# Patient Record
Sex: MALE | Race: WHITE
[De-identification: names, ages, dates, MRNs, and addresses within clinical notes are randomized per-mention and may not be internally consistent; named-entity substitution may affect disease eponyms.]

---

## 2019-10-17 ENCOUNTER — APPOINTMENT (OUTPATIENT)
Dept: GASTROENTEROLOGY | Facility: CLINIC | Age: 79
End: 2019-10-17
Payer: COMMERCIAL

## 2019-10-17 ENCOUNTER — LABORATORY RESULT (OUTPATIENT)
Age: 79
End: 2019-10-17

## 2019-10-17 VITALS
WEIGHT: 170 LBS | HEIGHT: 67 IN | DIASTOLIC BLOOD PRESSURE: 83 MMHG | SYSTOLIC BLOOD PRESSURE: 151 MMHG | BODY MASS INDEX: 26.68 KG/M2 | HEART RATE: 46 BPM

## 2019-10-17 DIAGNOSIS — Z80.0 FAMILY HISTORY OF MALIGNANT NEOPLASM OF DIGESTIVE ORGANS: ICD-10-CM

## 2019-10-17 DIAGNOSIS — K58.9 IRRITABLE BOWEL SYNDROME W/OUT DIARRHEA: ICD-10-CM

## 2019-10-17 DIAGNOSIS — R14.0 ABDOMINAL DISTENSION (GASEOUS): ICD-10-CM

## 2019-10-17 DIAGNOSIS — Z95.5 PRESENCE OF CORONARY ANGIOPLASTY IMPLANT AND GRAFT: ICD-10-CM

## 2019-10-17 DIAGNOSIS — Z86.010 PERSONAL HISTORY OF COLONIC POLYPS: ICD-10-CM

## 2019-10-17 DIAGNOSIS — E66.3 OVERWEIGHT: ICD-10-CM

## 2019-10-17 DIAGNOSIS — M19.071 PRIMARY OSTEOARTHRITIS, RIGHT ANKLE AND FOOT: ICD-10-CM

## 2019-10-17 DIAGNOSIS — I25.2 OLD MYOCARDIAL INFARCTION: ICD-10-CM

## 2019-10-17 DIAGNOSIS — Z72.89 OTHER PROBLEMS RELATED TO LIFESTYLE: ICD-10-CM

## 2019-10-17 DIAGNOSIS — Z86.79 PERSONAL HISTORY OF OTHER DISEASES OF THE CIRCULATORY SYSTEM: ICD-10-CM

## 2019-10-17 DIAGNOSIS — N40.0 BENIGN PROSTATIC HYPERPLASIA WITHOUT LOWER URINARY TRACT SYMPMS: ICD-10-CM

## 2019-10-17 DIAGNOSIS — E78.5 HYPERLIPIDEMIA, UNSPECIFIED: ICD-10-CM

## 2019-10-17 DIAGNOSIS — Z87.01 PERSONAL HISTORY OF PNEUMONIA (RECURRENT): ICD-10-CM

## 2019-10-17 DIAGNOSIS — M19.072 PRIMARY OSTEOARTHRITIS, RIGHT ANKLE AND FOOT: ICD-10-CM

## 2019-10-17 PROCEDURE — 99204 OFFICE O/P NEW MOD 45 MIN: CPT | Mod: 25

## 2019-10-17 PROCEDURE — 82274 ASSAY TEST FOR BLOOD FECAL: CPT | Mod: QW

## 2019-10-17 PROCEDURE — 36415 COLL VENOUS BLD VENIPUNCTURE: CPT

## 2019-10-17 RX ORDER — METOPROLOL TARTRATE 50 MG/1
50 TABLET, FILM COATED ORAL
Refills: 0 | Status: ACTIVE | COMMUNITY

## 2019-10-17 RX ORDER — ROSUVASTATIN CALCIUM 5 MG/1
5 TABLET, FILM COATED ORAL
Refills: 0 | Status: ACTIVE | COMMUNITY

## 2019-10-17 RX ORDER — ASPIRIN 81 MG
81 TABLET,CHEWABLE ORAL
Refills: 0 | Status: ACTIVE | COMMUNITY

## 2019-10-17 RX ORDER — OLMESARTAN MEDOXOMIL 20 MG/1
20 TABLET, FILM COATED ORAL
Refills: 0 | Status: ACTIVE | COMMUNITY

## 2019-10-17 NOTE — PHYSICAL EXAM
[General Appearance - Well Nourished] : well nourished [General Appearance - Alert] : alert [General Appearance - In No Acute Distress] : in no acute distress [Sclera] : the sclera and conjunctiva were normal [General Appearance - Well Developed] : well developed [Oropharynx] : the oropharynx was normal [Outer Ear] : the ears and nose were normal in appearance [Neck Appearance] : the appearance of the neck was normal [Neck Cervical Mass (___cm)] : no neck mass was observed [Jugular Venous Distention Increased] : there was no jugular-venous distention [Thyroid Diffuse Enlargement] : the thyroid was not enlarged [Auscultation Breath Sounds / Voice Sounds] : lungs were clear to auscultation bilaterally [Thyroid Nodule] : there were no palpable thyroid nodules [Heart Sounds] : normal S1 and S2 [Heart Sounds Gallop] : no gallops [Heart Rate And Rhythm] : heart rate was normal and rhythm regular [Murmurs] : no murmurs [Heart Sounds Pericardial Friction Rub] : no pericardial rub [Bowel Sounds] : normal bowel sounds [Full Pulse] : the pedal pulses are present [Edema] : there was no peripheral edema [Abdomen Soft] : soft [] : no hepato-splenomegaly [Abdomen Tenderness] : non-tender [Abdomen Mass (___ Cm)] : no abdominal mass palpated [Internal Hemorrhoid] : internal hemorrhoids [Normal Sphincter Tone] : normal sphincter tone [No Rectal Mass] : no rectal mass [Prostate Size___ (Scale 0-4)] : prostate size was [unfilled] on a scale of 0-4 [Cervical Lymph Nodes Enlarged Posterior Bilaterally] : posterior cervical [Cervical Lymph Nodes Enlarged Anterior Bilaterally] : anterior cervical [Axillary Lymph Nodes Enlarged Bilaterally] : axillary [Supraclavicular Lymph Nodes Enlarged Bilaterally] : supraclavicular [No Spinal Tenderness] : no spinal tenderness [Femoral Lymph Nodes Enlarged Bilaterally] : femoral [Inguinal Lymph Nodes Enlarged Bilaterally] : inguinal [No CVA Tenderness] : no ~M costovertebral angle tenderness [Skin Turgor] : normal skin turgor [Skin Color & Pigmentation] : normal skin color and pigmentation [Oriented To Time, Place, And Person] : oriented to person, place, and time [Affect] : the affect was normal [Impaired Insight] : insight and judgment were intact [External Hemorrhoid] : no external hemorrhoids [Occult Blood Positive] : stool was negative for occult blood [FreeTextEntry1] : FIT negative [Prostate Tenderness] : was not tender

## 2019-10-17 NOTE — CONSULT LETTER
[Dear  ___] : Dear  [unfilled], [Consult Letter:] : I had the pleasure of evaluating your patient, [unfilled]. [Sincerely,] : Sincerely, [Please see my note below.] : Please see my note below. [Consult Closing:] : Thank you very much for allowing me to participate in the care of this patient.  If you have any questions, please do not hesitate to contact me. [FreeTextEntry3] : Patrick Cancino M.D.\par  [DrMaribell  ___] : Dr. DUPREE

## 2019-10-17 NOTE — CONSULT LETTER
[Dear  ___] : Dear  [unfilled], [Consult Letter:] : I had the pleasure of evaluating your patient, [unfilled]. [Please see my note below.] : Please see my note below. [Sincerely,] : Sincerely, [Consult Closing:] : Thank you very much for allowing me to participate in the care of this patient.  If you have any questions, please do not hesitate to contact me. [FreeTextEntry3] : Patrick Cancino M.D.\par  [DrMaribell  ___] : Dr. DUPREE

## 2019-10-17 NOTE — HISTORY OF PRESENT ILLNESS
[FreeTextEntry1] : Over the past year, Momo has noted subtle change in bowels. He typically awakens at 4 AM, takes nearly 30 minutes to fully eliminate (BMs). Stools are loose at times, but are generally well formed. He reports gassiness, tenesmus, and urgency. He thinks that his father had stomach cancer, but possibly it was colon cancer. Last colonoscopy January 2015 revealed hyperplastic polyps and hemorrhoids; an adenoma had been removed at prior colonoscopy.

## 2019-10-17 NOTE — ASSESSMENT
[FreeTextEntry1] : 1. Erratic bowels with gassiness, tenesmus, urgency; history of colonic adenoma, with hyperplastic polyps and hemorrhoids at last colonoscopy January 2015--suspect IBS. Possible IBD, celiac disease.\par 2. Overweight.\par 3. History of MI (plaque dislodged from frequent coughing at time of pneumonia); status post CAD stent December 2012, maintained on baby aspirin. \par 4. History of arrhythmia, well controlled on metoprolol.\par 5. Hyperlipidemia.\par 6. BPH.\par 7. Osteoarthritis, DJD of spine; status post right knee and bilateral hip replacements, right shoulder biceps surgery.\par \par Plan:\par 1. Extensive bloodwork drawn by me this afternoon. He will call for test results after the weekend.\par 2. Try Benefiber 1 heaping tablespoon daily; can increase to twice daily dosing after a couple of weeks if needed.\par 3. Consider repeat colonoscopy (although he would like to abstain from further testing, given risk considerations at his age) and even EGD, pending clinical course. This recommendation may be influenced by lab results.

## 2019-10-18 LAB
ALBUMIN SERPL ELPH-MCNC: 4.5 G/DL
ALP BLD-CCNC: 96 U/L
ALT SERPL-CCNC: 31 U/L
ANION GAP SERPL CALC-SCNC: 13 MMOL/L
AST SERPL-CCNC: 24 U/L
BASOPHILS # BLD AUTO: 0.03 K/UL
BASOPHILS NFR BLD AUTO: 0.5 %
BILIRUB DIRECT SERPL-MCNC: 0.1 MG/DL
BILIRUB SERPL-MCNC: 0.4 MG/DL
BUN SERPL-MCNC: 20 MG/DL
CALCIUM SERPL-MCNC: 10 MG/DL
CHLORIDE SERPL-SCNC: 103 MMOL/L
CHOLEST SERPL-MCNC: 192 MG/DL
CHOLEST/HDLC SERPL: 4.7 RATIO
CO2 SERPL-SCNC: 26 MMOL/L
CREAT SERPL-MCNC: 1.08 MG/DL
CRP SERPL-MCNC: <0.1 MG/DL
EOSINOPHIL # BLD AUTO: 0.14 K/UL
EOSINOPHIL NFR BLD AUTO: 2.3 %
ERYTHROCYTE [SEDIMENTATION RATE] IN BLOOD BY WESTERGREN METHOD: 10 MM/HR
ESTIMATED AVERAGE GLUCOSE: 134 MG/DL
FERRITIN SERPL-MCNC: 185 NG/ML
GGT SERPL-CCNC: 29 U/L
GLUCOSE SERPL-MCNC: 126 MG/DL
HBA1C MFR BLD HPLC: 6.3 %
HCT VFR BLD CALC: 44.6 %
HDLC SERPL-MCNC: 41 MG/DL
HGB BLD-MCNC: 14.6 G/DL
IGA SER QL IEP: 172 MG/DL
IMM GRANULOCYTES NFR BLD AUTO: 0.2 %
IRON SATN MFR SERPL: 22 %
IRON SERPL-MCNC: 79 UG/DL
LDLC SERPL CALC-MCNC: NORMAL MG/DL
LYMPHOCYTES # BLD AUTO: 1.93 K/UL
LYMPHOCYTES NFR BLD AUTO: 32.2 %
MAGNESIUM SERPL-MCNC: 2 MG/DL
MAN DIFF?: NORMAL
MCHC RBC-ENTMCNC: 31.1 PG
MCHC RBC-ENTMCNC: 32.7 GM/DL
MCV RBC AUTO: 95.1 FL
MONOCYTES # BLD AUTO: 0.46 K/UL
MONOCYTES NFR BLD AUTO: 7.7 %
NEUTROPHILS # BLD AUTO: 3.42 K/UL
NEUTROPHILS NFR BLD AUTO: 57.1 %
PHOSPHATE SERPL-MCNC: 3.7 MG/DL
PLATELET # BLD AUTO: 207 K/UL
POTASSIUM SERPL-SCNC: 4.7 MMOL/L
PROT SERPL-MCNC: 6.9 G/DL
PSA SERPL-MCNC: 0.47 NG/ML
RBC # BLD: 4.69 M/UL
RBC # FLD: 13.1 %
SODIUM SERPL-SCNC: 141 MMOL/L
T3 SERPL-MCNC: 111 NG/DL
T3RU NFR SERPL: 1 TBI
T4 FREE SERPL-MCNC: 1 NG/DL
T4 SERPL-MCNC: 5.8 UG/DL
TIBC SERPL-MCNC: 365 UG/DL
TRIGL SERPL-MCNC: 459 MG/DL
TSH SERPL-ACNC: 1.51 UIU/ML
UIBC SERPL-MCNC: 286 UG/DL
WBC # FLD AUTO: 5.99 K/UL

## 2019-10-21 LAB
ENDOMYSIUM IGA SER QL: NEGATIVE
ENDOMYSIUM IGA TITR SER: NORMAL
GLIADIN IGA SER QL: 5.5 UNITS
GLIADIN IGG SER QL: <5 UNITS
GLIADIN PEPTIDE IGA SER-ACNC: NEGATIVE
GLIADIN PEPTIDE IGG SER-ACNC: NEGATIVE
TTG IGA SER IA-ACNC: <1.2 U/ML
TTG IGA SER-ACNC: NEGATIVE
TTG IGG SER IA-ACNC: 1.2 U/ML
TTG IGG SER IA-ACNC: NEGATIVE

## 2019-10-22 ENCOUNTER — RESULT REVIEW (OUTPATIENT)
Age: 79
End: 2019-10-22

## 2023-02-27 ENCOUNTER — OFFICE (OUTPATIENT)
Dept: URBAN - METROPOLITAN AREA CLINIC 8 | Facility: CLINIC | Age: 83
Setting detail: OPHTHALMOLOGY
End: 2023-02-27
Payer: COMMERCIAL

## 2023-02-27 DIAGNOSIS — H11.041: ICD-10-CM

## 2023-02-27 DIAGNOSIS — H25.13: ICD-10-CM

## 2023-02-27 PROCEDURE — 92004 COMPRE OPH EXAM NEW PT 1/>: CPT | Performed by: OPHTHALMOLOGY

## 2023-02-27 ASSESSMENT — REFRACTION_MANIFEST
OS_ADD: +2.75
OD_SPHERE: +5.25
OS_VA1: 20/NI
OD_AXIS: 110
OD_VA1: 20/25
OD_CYLINDER: -1.00
OD_ADD: +2.75
OS_SPHERE: +5.50
OU_VA: 20/25-
OS_AXIS: 090
OS_CYLINDER: -1.25
OS_VA2: 20/25(J1)
OD_VA2: 20/25(J1)

## 2023-02-27 ASSESSMENT — VISUAL ACUITY
OD_BCVA: 20/30-2
OS_BCVA: 20/25-

## 2023-02-27 ASSESSMENT — REFRACTION_CURRENTRX
OS_ADD: +2.50
OD_OVR_VA: 20/
OS_OVR_VA: 20/
OD_ADD: +2.50
OS_VPRISM_DIRECTION: PROGS
OS_CYLINDER: -1.00
OD_VPRISM_DIRECTION: PROGS
OD_AXIS: 112
OS_SPHERE: +5.25
OD_SPHERE: +5.25
OD_CYLINDER: -1.00
OS_AXIS: 066

## 2023-02-27 ASSESSMENT — SPHEQUIV_DERIVED
OS_SPHEQUIV: 4.75
OS_SPHEQUIV: 4.875
OD_SPHEQUIV: 4.625
OD_SPHEQUIV: 4.75

## 2023-02-27 ASSESSMENT — CONFRONTATIONAL VISUAL FIELD TEST (CVF)
OD_FINDINGS: FULL
OS_FINDINGS: FULL

## 2023-02-27 ASSESSMENT — AXIALLENGTH_DERIVED
OS_AL: 21.2597
OS_AL: 21.2993
OD_AL: 21.3767
OD_AL: 21.3368

## 2023-02-27 ASSESSMENT — KERATOMETRY
OS_K1POWER_DIOPTERS: 45.00
OD_K1POWER_DIOPTERS: 45.00
OD_K2POWER_DIOPTERS: 45.50
OD_AXISANGLE_DEGREES: 129
OS_AXISANGLE_DEGREES: 041
OS_K2POWER_DIOPTERS: 45.75

## 2023-02-27 ASSESSMENT — REFRACTION_AUTOREFRACTION
OS_CYLINDER: -1.50
OS_AXIS: 093
OD_CYLINDER: -1.25
OD_AXIS: 088
OS_SPHERE: +5.50
OD_SPHERE: +5.25

## 2023-02-27 ASSESSMENT — CORNEAL PTERYGIUM: OD_PTERYGIUM: NASAL 1MM

## 2023-09-27 ENCOUNTER — OFFICE (OUTPATIENT)
Dept: URBAN - METROPOLITAN AREA CLINIC 8 | Facility: CLINIC | Age: 83
Setting detail: OPHTHALMOLOGY
End: 2023-09-27
Payer: COMMERCIAL

## 2023-09-27 DIAGNOSIS — H11.041: ICD-10-CM

## 2023-09-27 DIAGNOSIS — H25.13: ICD-10-CM

## 2023-09-27 PROCEDURE — 99214 OFFICE O/P EST MOD 30 MIN: CPT | Performed by: OPHTHALMOLOGY

## 2023-09-27 ASSESSMENT — KERATOMETRY
OS_K2POWER_DIOPTERS: 45.75
OD_K2POWER_DIOPTERS: 45.25
OD_K1POWER_DIOPTERS: 45.00
OS_AXISANGLE_DEGREES: 008
METHOD_AUTO_MANUAL: AUTO
OS_K1POWER_DIOPTERS: 44.75
OD_AXISANGLE_DEGREES: 057

## 2023-09-27 ASSESSMENT — REFRACTION_AUTOREFRACTION
OS_AXIS: 088
OS_SPHERE: +5.75
OD_CYLINDER: -1.75
OS_CYLINDER: -1.75
OD_AXIS: 102
OD_SPHERE: +5.50

## 2023-09-27 ASSESSMENT — AXIALLENGTH_DERIVED
OD_AL: 21.4145
OS_AL: 21.2971
OS_AL: 21.2971
OD_AL: 21.4145

## 2023-09-27 ASSESSMENT — REFRACTION_MANIFEST
OD_VA1: 20/60+1
OU_VA: 20/30-2
OD_VA2: 20/40(J3)
OD_ADD: +2.75
OD_AXIS: 100
OS_VA2: 20/40(J3)
OS_CYLINDER: -1.75
OS_AXIS: 090
OD_CYLINDER: -1.75
OS_SPHERE: +5.75
OS_VA1: 20/70+2
OS_ADD: +2.75
OD_SPHERE: +5.50

## 2023-09-27 ASSESSMENT — REFRACTION_CURRENTRX
OS_AXIS: 071
OS_SPHERE: +5.25
OD_OVR_VA: 20/
OD_SPHERE: +5.25
OS_ADD: +2.25
OD_VPRISM_DIRECTION: PROGS
OS_VPRISM_DIRECTION: PROGS
OS_CYLINDER: -1.00
OD_CYLINDER: -1.00
OD_ADD: +2.25
OD_AXIS: 110
OS_OVR_VA: 20/

## 2023-09-27 ASSESSMENT — SPHEQUIV_DERIVED
OD_SPHEQUIV: 4.625
OD_SPHEQUIV: 4.625
OS_SPHEQUIV: 4.875
OS_SPHEQUIV: 4.875

## 2023-09-27 ASSESSMENT — VISUAL ACUITY
OS_BCVA: 20/70+2
OD_BCVA: 20/125

## 2023-09-27 ASSESSMENT — CONFRONTATIONAL VISUAL FIELD TEST (CVF)
OS_FINDINGS: FULL
OD_FINDINGS: FULL

## 2023-09-27 ASSESSMENT — CORNEAL PTERYGIUM: OD_PTERYGIUM: NASAL 1MM

## 2023-10-16 ENCOUNTER — OFFICE (OUTPATIENT)
Dept: URBAN - METROPOLITAN AREA CLINIC 8 | Facility: CLINIC | Age: 83
Setting detail: OPHTHALMOLOGY
End: 2023-10-16
Payer: MEDICARE

## 2023-10-16 DIAGNOSIS — H25.12: ICD-10-CM

## 2023-10-16 DIAGNOSIS — H25.13: ICD-10-CM

## 2023-10-16 PROCEDURE — 92136 OPHTHALMIC BIOMETRY: CPT | Mod: 26,LT | Performed by: OPHTHALMOLOGY

## 2023-10-16 PROCEDURE — 92136 OPHTHALMIC BIOMETRY: CPT | Mod: TC | Performed by: OPHTHALMOLOGY

## 2023-10-16 PROCEDURE — 99213 OFFICE O/P EST LOW 20 MIN: CPT | Performed by: OPHTHALMOLOGY

## 2023-10-16 ASSESSMENT — REFRACTION_CURRENTRX
OD_OVR_VA: 20/
OS_VPRISM_DIRECTION: PROGS
OD_CYLINDER: -1.00
OS_SPHERE: +5.25
OD_AXIS: 110
OD_VPRISM_DIRECTION: PROGS
OS_CYLINDER: -1.00
OS_ADD: +2.25
OS_AXIS: 071
OD_ADD: +2.25
OS_OVR_VA: 20/
OD_SPHERE: +5.25

## 2023-10-16 ASSESSMENT — REFRACTION_MANIFEST
OD_CYLINDER: -1.75
OU_VA: 20/30-2
OS_VA1: 20/70+2
OS_VA2: 20/40(J3)
OD_SPHERE: +5.50
OD_VA2: 20/40(J3)
OS_ADD: +2.75
OS_SPHERE: +5.75
OD_AXIS: 100
OD_VA1: 20/60+1
OD_ADD: +2.75
OS_AXIS: 090
OS_CYLINDER: -1.75

## 2023-10-16 ASSESSMENT — VISUAL ACUITY
OD_BCVA: 20/125
OS_BCVA: 20/70+2

## 2023-10-16 ASSESSMENT — CONFRONTATIONAL VISUAL FIELD TEST (CVF)
OS_FINDINGS: FULL
OD_FINDINGS: FULL

## 2023-10-16 ASSESSMENT — AXIALLENGTH_DERIVED
OS_AL: 21.2971
OD_AL: 21.5762
OS_AL: 21.3368
OD_AL: 21.4145

## 2023-10-16 ASSESSMENT — REFRACTION_AUTOREFRACTION
OS_SPHERE: +5.50
OD_SPHERE: +5.00
OS_AXIS: 086
OS_CYLINDER: -1.50
OD_AXIS: 098
OD_CYLINDER: -1.75

## 2023-10-16 ASSESSMENT — KERATOMETRY
OD_AXISANGLE_DEGREES: 135
OS_AXISANGLE_DEGREES: 006
OD_K1POWER_DIOPTERS: 45.00
OS_K2POWER_DIOPTERS: 46.00
METHOD_AUTO_MANUAL: AUTO
OS_K1POWER_DIOPTERS: 44.50
OD_K2POWER_DIOPTERS: 45.25

## 2023-10-16 ASSESSMENT — SPHEQUIV_DERIVED
OS_SPHEQUIV: 4.75
OS_SPHEQUIV: 4.875
OD_SPHEQUIV: 4.625
OD_SPHEQUIV: 4.125

## 2023-10-16 ASSESSMENT — CORNEAL PTERYGIUM: OD_PTERYGIUM: NASAL 1MM

## 2023-11-14 ENCOUNTER — AMBULATORY SURGERY CENTER (OUTPATIENT)
Dept: URBAN - METROPOLITAN AREA SURGERY 4 | Facility: SURGERY | Age: 83
Setting detail: OPHTHALMOLOGY
End: 2023-11-14
Payer: MEDICARE

## 2023-11-14 DIAGNOSIS — H52.212: ICD-10-CM

## 2023-11-14 DIAGNOSIS — H25.12: ICD-10-CM

## 2023-11-14 PROCEDURE — V2787 ASTIGMATISM-CORRECT FUNCTION: HCPCS | Performed by: OPHTHALMOLOGY

## 2023-11-14 PROCEDURE — A9270 NON-COVERED ITEM OR SERVICE: HCPCS | Mod: GY | Performed by: OPHTHALMOLOGY

## 2023-11-14 PROCEDURE — FEMTO FEMTOSECOND LASER: Mod: GY | Performed by: OPHTHALMOLOGY

## 2023-11-14 PROCEDURE — 66984 XCAPSL CTRC RMVL W/O ECP: CPT | Mod: LT | Performed by: OPHTHALMOLOGY

## 2023-11-15 ENCOUNTER — OFFICE (OUTPATIENT)
Dept: URBAN - METROPOLITAN AREA CLINIC 8 | Facility: CLINIC | Age: 83
Setting detail: OPHTHALMOLOGY
End: 2023-11-15
Payer: COMMERCIAL

## 2023-11-15 ENCOUNTER — RX ONLY (RX ONLY)
Age: 83
End: 2023-11-15

## 2023-11-15 DIAGNOSIS — H25.11: ICD-10-CM

## 2023-11-15 PROCEDURE — 92136 OPHTHALMIC BIOMETRY: CPT | Mod: 26,RT | Performed by: OPHTHALMOLOGY

## 2023-11-15 ASSESSMENT — CONFRONTATIONAL VISUAL FIELD TEST (CVF)
OS_FINDINGS: FULL
OD_FINDINGS: FULL

## 2023-11-15 ASSESSMENT — CORNEAL PTERYGIUM: OD_PTERYGIUM: NASAL 1MM

## 2023-11-15 ASSESSMENT — CORNEAL EDEMA CLINICAL DESCRIPTION: OS_CORNEALEDEMA: T @INCISION

## 2023-11-17 ASSESSMENT — REFRACTION_CURRENTRX
OD_SPHERE: +5.25
OS_OVR_VA: 20/
OS_VPRISM_DIRECTION: PROGS
OD_CYLINDER: -1.00
OD_OVR_VA: 20/
OS_ADD: +2.25
OS_AXIS: 071
OS_SPHERE: +5.25
OS_CYLINDER: -1.00
OD_AXIS: 110
OD_ADD: +2.25
OD_VPRISM_DIRECTION: PROGS

## 2023-11-17 ASSESSMENT — REFRACTION_AUTOREFRACTION
OS_SPHERE: +1.00
OD_AXIS: 095
OS_CYLINDER: -1.00
OD_SPHERE: +5.25
OD_CYLINDER: -1.75
OS_AXIS: 145

## 2023-11-17 ASSESSMENT — REFRACTION_MANIFEST
OD_SPHERE: +5.50
OS_VA2: 20/40(J3)
OS_AXIS: 090
OS_SPHERE: +5.75
OD_VA2: 20/40(J3)
OD_VA1: 20/60+1
OS_ADD: +2.75
OS_CYLINDER: -1.75
OS_VA1: 20/70+2
OU_VA: 20/30-2
OD_CYLINDER: -1.75
OD_AXIS: 100
OD_ADD: +2.75

## 2023-11-17 ASSESSMENT — SPHEQUIV_DERIVED
OD_SPHEQUIV: 4.375
OD_SPHEQUIV: 4.625
OS_SPHEQUIV: 4.875
OS_SPHEQUIV: 0.5

## 2023-11-28 ENCOUNTER — AMBULATORY SURGERY CENTER (OUTPATIENT)
Dept: URBAN - METROPOLITAN AREA SURGERY 4 | Facility: SURGERY | Age: 83
Setting detail: OPHTHALMOLOGY
End: 2023-11-28
Payer: COMMERCIAL

## 2023-11-28 DIAGNOSIS — H25.11: ICD-10-CM

## 2023-11-28 DIAGNOSIS — H52.211: ICD-10-CM

## 2023-11-28 PROCEDURE — FEMTO FEMTOSECOND LASER: Mod: GY | Performed by: OPHTHALMOLOGY

## 2023-11-28 PROCEDURE — 66984 XCAPSL CTRC RMVL W/O ECP: CPT | Mod: RT | Performed by: OPHTHALMOLOGY

## 2023-11-28 PROCEDURE — V2787 ASTIGMATISM-CORRECT FUNCTION: HCPCS | Performed by: OPHTHALMOLOGY

## 2023-11-28 PROCEDURE — A9270 NON-COVERED ITEM OR SERVICE: HCPCS | Mod: GY | Performed by: OPHTHALMOLOGY

## 2023-11-29 ENCOUNTER — OFFICE (OUTPATIENT)
Dept: URBAN - METROPOLITAN AREA CLINIC 8 | Facility: CLINIC | Age: 83
Setting detail: OPHTHALMOLOGY
End: 2023-11-29
Payer: COMMERCIAL

## 2023-11-29 DIAGNOSIS — Z96.1: ICD-10-CM

## 2023-11-29 PROCEDURE — 99024 POSTOP FOLLOW-UP VISIT: CPT | Performed by: OPHTHALMOLOGY

## 2023-11-29 ASSESSMENT — CONFRONTATIONAL VISUAL FIELD TEST (CVF)
OD_FINDINGS: FULL
OS_FINDINGS: FULL

## 2023-11-29 ASSESSMENT — CORNEAL PTERYGIUM: OD_PTERYGIUM: NASAL 1MM

## 2023-11-29 ASSESSMENT — CORNEAL EDEMA CLINICAL DESCRIPTION: OD_CORNEALEDEMA: @INCISION

## 2023-11-30 PROBLEM — Z96.1 PSEUDOPHAKIA: POST OP WEEK 1 ; LEFT EYE: Status: ACTIVE | Noted: 2023-11-29

## 2023-11-30 PROBLEM — Z96.1 PSEUDOPHAKIA: POST OP DAY 1 ; RIGHT EYE: Status: ACTIVE | Noted: 2023-11-15

## 2023-11-30 ASSESSMENT — REFRACTION_AUTOREFRACTION
OD_SPHERE: +5.25
OS_CYLINDER: -1.00
OD_CYLINDER: -1.75
OS_AXIS: 145
OS_SPHERE: +1.00
OD_AXIS: 095

## 2023-11-30 ASSESSMENT — REFRACTION_MANIFEST
OD_VA1: 20/60+1
OD_CYLINDER: -1.75
OD_VA1: 20/60+1
OD_SPHERE: +5.50
OS_CYLINDER: -1.75
OD_ADD: +2.75
OS_VA2: 20/40(J3)
OS_VA2: 20/40(J3)
OS_SPHERE: +5.75
OS_VA1: 20/70+2
OS_ADD: +2.75
OS_ADD: +2.00
OD_VA2: 20/40(J3)
OD_CYLINDER: -1.75
OS_SPHERE: -0.25
OS_AXIS: 090
OD_VA2: 20/40(J3)
OS_AXIS: 150
OD_AXIS: 100
OU_VA: 20/30-2
OD_SPHERE: +5.50
OS_VA1: 20/70+2
OD_ADD: +2.75
OD_AXIS: 100
OU_VA: 20/30-2
OS_CYLINDER: -0.75

## 2023-11-30 ASSESSMENT — REFRACTION_CURRENTRX
OD_ADD: +2.25
OD_CYLINDER: -1.00
OD_SPHERE: +5.25
OS_OVR_VA: 20/
OD_VPRISM_DIRECTION: PROGS
OD_AXIS: 110
OS_VPRISM_DIRECTION: PROGS
OS_CYLINDER: -1.00
OD_OVR_VA: 20/
OS_SPHERE: +5.25
OS_ADD: +2.25
OS_AXIS: 071

## 2023-11-30 ASSESSMENT — SPHEQUIV_DERIVED
OD_SPHEQUIV: 4.625
OS_SPHEQUIV: 0.5
OD_SPHEQUIV: 4.625
OD_SPHEQUIV: 4.375
OS_SPHEQUIV: -0.625
OS_SPHEQUIV: 4.875

## 2023-12-27 ENCOUNTER — OFFICE (OUTPATIENT)
Dept: URBAN - METROPOLITAN AREA CLINIC 8 | Facility: CLINIC | Age: 83
Setting detail: OPHTHALMOLOGY
End: 2023-12-27
Payer: COMMERCIAL

## 2023-12-27 DIAGNOSIS — Z96.1: ICD-10-CM

## 2023-12-27 PROCEDURE — 99024 POSTOP FOLLOW-UP VISIT: CPT | Performed by: OPHTHALMOLOGY

## 2023-12-27 ASSESSMENT — REFRACTION_MANIFEST
OS_SPHERE: -0.25
OD_VA2: 20/40(J3)
OD_ADD: +2.75
OS_ADD: +2.75
OS_VA1: 20/NI
OD_SPHERE: +1.25
OS_VA1: 20/25-2
OS_ADD: +2.00
OD_VA1: 20/NI
OS_VA2: 20/40(J3)
OD_AXIS: 100
OS_SPHERE: -0.25
OD_VA1: 20/40-
OD_AXIS: 100
OU_VA: 20/50-
OD_CYLINDER: -1.50
OS_VA2: 20/40(J3)
OD_ADD: +2.75
OS_CYLINDER: SPH
OD_SPHERE: +1.00
OD_CYLINDER: -1.00
OS_CYLINDER: SPH
OU_VA: 20/30-2
OD_VA2: 20/40(J3)

## 2023-12-27 ASSESSMENT — REFRACTION_AUTOREFRACTION
OS_AXIS: 143
OD_AXIS: 099
OS_SPHERE: -0.25
OD_SPHERE: +1.00
OD_CYLINDER: -1.75
OS_CYLINDER: -0.25

## 2023-12-27 ASSESSMENT — CORNEAL EDEMA - FOLDS/STRIAE
OS_FOLDSSTRIAE: T
OD_FOLDSSTRIAE: T 1+

## 2023-12-27 ASSESSMENT — CONFRONTATIONAL VISUAL FIELD TEST (CVF)
OS_FINDINGS: FULL
OD_FINDINGS: FULL

## 2023-12-27 ASSESSMENT — REFRACTION_CURRENTRX
OS_VPRISM_DIRECTION: PROGS
OD_OVR_VA: 20/
OD_ADD: +2.25
OD_SPHERE: +5.25
OS_CYLINDER: -1.00
OS_AXIS: 071
OD_CYLINDER: -1.00
OD_VPRISM_DIRECTION: PROGS
OS_ADD: +2.25
OS_SPHERE: +5.25
OS_OVR_VA: 20/
OD_AXIS: 110

## 2023-12-27 ASSESSMENT — CORNEAL PTERYGIUM: OD_PTERYGIUM: NASAL 1MM

## 2023-12-27 ASSESSMENT — SPHEQUIV_DERIVED
OS_SPHEQUIV: -0.375
OD_SPHEQUIV: 0.75
OD_SPHEQUIV: 0.25
OD_SPHEQUIV: 0.125

## 2024-01-17 ENCOUNTER — OFFICE (OUTPATIENT)
Dept: URBAN - METROPOLITAN AREA CLINIC 8 | Facility: CLINIC | Age: 84
Setting detail: OPHTHALMOLOGY
End: 2024-01-17
Payer: COMMERCIAL

## 2024-01-17 DIAGNOSIS — Z96.1: ICD-10-CM

## 2024-01-17 PROBLEM — H18.513 ENDOTHELIAL CORNEAL DYSTROPHY; BOTH EYES: Status: ACTIVE | Noted: 2024-01-17

## 2024-01-17 PROCEDURE — 99024 POSTOP FOLLOW-UP VISIT: CPT | Performed by: OPHTHALMOLOGY

## 2024-01-17 ASSESSMENT — REFRACTION_MANIFEST
OS_SPHERE: -0.25
OS_ADD: +3.00
OD_AXIS: 100
OD_AXIS: 090
OD_ADD: +2.75
OS_VA1: 20/NI
OS_VA2: 20/40(J3)
OD_ADD: +3.00
OD_SPHERE: +1.00
OD_VA2: 20/40(J3)
OS_VA2: 20/40(J3)
OD_CYLINDER: -1.25
OD_VA1: 20/NI
OS_CYLINDER: -0.50
OS_SPHERE: +0.50
OS_AXIS: 125
OD_VA2: 20/40(J3)
OU_VA: 20/30-2
OU_VA: 20/50-
OD_CYLINDER: -1.50
OD_SPHERE: +1.50
OS_CYLINDER: SPH
OD_VA1: 20/40-
OS_ADD: +2.75

## 2024-01-17 ASSESSMENT — REFRACTION_AUTOREFRACTION
OS_SPHERE: -0.25
OS_CYLINDER: -0.25
OD_CYLINDER: -1.75
OD_SPHERE: +1.00
OD_AXIS: 099
OS_AXIS: 143

## 2024-01-17 ASSESSMENT — REFRACTION_CURRENTRX
OS_SPHERE: +5.25
OS_AXIS: 071
OD_ADD: +2.25
OS_ADD: +2.25
OS_VPRISM_DIRECTION: PROGS
OD_OVR_VA: 20/
OS_OVR_VA: 20/
OS_CYLINDER: -1.00
OD_CYLINDER: -1.00
OD_VPRISM_DIRECTION: PROGS
OD_AXIS: 110
OD_SPHERE: +5.25

## 2024-01-17 ASSESSMENT — CONFRONTATIONAL VISUAL FIELD TEST (CVF)
OS_FINDINGS: FULL
OD_FINDINGS: FULL

## 2024-01-17 ASSESSMENT — CORNEAL EDEMA - FOLDS/STRIAE
OD_FOLDSSTRIAE: T
OS_FOLDSSTRIAE: T

## 2024-01-17 ASSESSMENT — SPHEQUIV_DERIVED
OS_SPHEQUIV: -0.375
OD_SPHEQUIV: 0.875
OD_SPHEQUIV: 0.25
OD_SPHEQUIV: 0.125
OS_SPHEQUIV: 0.25

## 2024-01-17 ASSESSMENT — CORNEAL PTERYGIUM: OD_PTERYGIUM: NASAL 1MM

## 2024-02-14 ENCOUNTER — OFFICE (OUTPATIENT)
Dept: URBAN - METROPOLITAN AREA CLINIC 8 | Facility: CLINIC | Age: 84
Setting detail: OPHTHALMOLOGY
End: 2024-02-14
Payer: COMMERCIAL

## 2024-02-14 DIAGNOSIS — H18.513: ICD-10-CM

## 2024-02-14 DIAGNOSIS — Z96.1: ICD-10-CM

## 2024-02-14 DIAGNOSIS — H52.4: ICD-10-CM

## 2024-02-14 PROCEDURE — 92015 DETERMINE REFRACTIVE STATE: CPT | Performed by: OPHTHALMOLOGY

## 2024-02-14 PROCEDURE — 99213 OFFICE O/P EST LOW 20 MIN: CPT | Performed by: OPHTHALMOLOGY

## 2024-02-14 ASSESSMENT — REFRACTION_AUTOREFRACTION
OD_SPHERE: +1.25
OS_SPHERE: +0.50
OS_CYLINDER: -0.75
OD_AXIS: 098
OS_AXIS: 135
OD_CYLINDER: -1.50

## 2024-02-14 ASSESSMENT — REFRACTION_MANIFEST
OU_VA: 20/50-
OS_VA2: 20/40(J3)
OD_SPHERE: +1.25
OD_ADD: +3.25
OS_SPHERE: -0.25
OS_VA1: 20/50-
OS_CYLINDER: -0.50
OS_ADD: +3.25
OD_VA2: 20/40(J3)
OD_VA1: 20/NI
OS_VA2: 20/40(J3)
OS_SPHERE: +0.25
OS_VA1: 20/NI
OS_AXIS: 135
OD_CYLINDER: -1.50
OD_CYLINDER: -1.50
OU_VA: 20/50-
OD_VA2: 20/40(J3)
OS_CYLINDER: SPH
OD_ADD: +2.75
OD_SPHERE: +1.00
OD_VA1: 20/50-
OD_AXIS: 100
OD_AXIS: 100
OS_ADD: +2.75

## 2024-02-14 ASSESSMENT — REFRACTION_CURRENTRX
OD_SPHERE: +1.00
OS_AXIS: 158
OS_OVR_VA: 20/
OS_CYLINDER: -0.50
OS_VPRISM_DIRECTION: PROGS
OS_ADD: +2.50
OD_AXIS: 114
OD_OVR_VA: 20/
OD_VPRISM_DIRECTION: PROGS
OD_ADD: +2.50
OS_SPHERE: +1.00
OD_CYLINDER: -1.00

## 2024-02-14 ASSESSMENT — CONFRONTATIONAL VISUAL FIELD TEST (CVF)
OD_FINDINGS: FULL
OS_FINDINGS: FULL

## 2024-02-14 ASSESSMENT — CORNEAL EDEMA - FOLDS/STRIAE
OS_FOLDSSTRIAE: T
OD_FOLDSSTRIAE: T

## 2024-02-14 ASSESSMENT — SPHEQUIV_DERIVED
OS_SPHEQUIV: 0
OS_SPHEQUIV: 0.125
OD_SPHEQUIV: 0.25
OD_SPHEQUIV: 0.5
OD_SPHEQUIV: 0.5

## 2024-02-14 ASSESSMENT — CORNEAL PTERYGIUM: OD_PTERYGIUM: NASAL 1MM

## 2024-08-14 ENCOUNTER — RX ONLY (RX ONLY)
Age: 84
End: 2024-08-14

## 2024-08-14 ENCOUNTER — OFFICE (OUTPATIENT)
Dept: URBAN - METROPOLITAN AREA CLINIC 8 | Facility: CLINIC | Age: 84
Setting detail: OPHTHALMOLOGY
End: 2024-08-14
Payer: MEDICARE

## 2024-08-14 DIAGNOSIS — Z96.1: ICD-10-CM

## 2024-08-14 DIAGNOSIS — H26.493: ICD-10-CM

## 2024-08-14 DIAGNOSIS — H11.041: ICD-10-CM

## 2024-08-14 DIAGNOSIS — H16.223: ICD-10-CM

## 2024-08-14 DIAGNOSIS — D31.31: ICD-10-CM

## 2024-08-14 DIAGNOSIS — G43.109: ICD-10-CM

## 2024-08-14 DIAGNOSIS — H18.513: ICD-10-CM

## 2024-08-14 PROCEDURE — 92014 COMPRE OPH EXAM EST PT 1/>: CPT | Performed by: OPHTHALMOLOGY

## 2024-08-14 ASSESSMENT — CONFRONTATIONAL VISUAL FIELD TEST (CVF)
OS_FINDINGS: FULL
OD_FINDINGS: FULL

## 2024-12-24 PROBLEM — F10.90 ALCOHOL USE: Status: ACTIVE | Noted: 2019-10-17

## 2025-05-21 ENCOUNTER — OUTPATIENT (OUTPATIENT)
Dept: OUTPATIENT SERVICES | Facility: HOSPITAL | Age: 85
LOS: 1 days | End: 2025-05-21
Payer: MEDICARE

## 2025-05-21 ENCOUNTER — TRANSCRIPTION ENCOUNTER (OUTPATIENT)
Age: 85
End: 2025-05-21

## 2025-05-21 VITALS
DIASTOLIC BLOOD PRESSURE: 70 MMHG | SYSTOLIC BLOOD PRESSURE: 161 MMHG | OXYGEN SATURATION: 96 % | TEMPERATURE: 98 F | RESPIRATION RATE: 16 BRPM | HEART RATE: 44 BPM

## 2025-05-21 VITALS — WEIGHT: 166.89 LBS | HEIGHT: 67 IN

## 2025-05-21 DIAGNOSIS — R94.39 ABNORMAL RESULT OF OTHER CARDIOVASCULAR FUNCTION STUDY: ICD-10-CM

## 2025-05-21 LAB
ANION GAP SERPL CALC-SCNC: 11 MMOL/L — SIGNIFICANT CHANGE UP (ref 5–17)
BUN SERPL-MCNC: 21 MG/DL — SIGNIFICANT CHANGE UP (ref 7–23)
CALCIUM SERPL-MCNC: 9.5 MG/DL — SIGNIFICANT CHANGE UP (ref 8.4–10.5)
CHLORIDE SERPL-SCNC: 104 MMOL/L — SIGNIFICANT CHANGE UP (ref 96–108)
CO2 SERPL-SCNC: 24 MMOL/L — SIGNIFICANT CHANGE UP (ref 22–31)
CREAT SERPL-MCNC: 1.21 MG/DL — SIGNIFICANT CHANGE UP (ref 0.5–1.3)
EGFR: 59 ML/MIN/1.73M2 — LOW
EGFR: 59 ML/MIN/1.73M2 — LOW
GLUCOSE SERPL-MCNC: 138 MG/DL — HIGH (ref 70–99)
HCT VFR BLD CALC: 42.5 % — SIGNIFICANT CHANGE UP (ref 39–50)
HGB BLD-MCNC: 14 G/DL — SIGNIFICANT CHANGE UP (ref 13–17)
MCHC RBC-ENTMCNC: 31.2 PG — SIGNIFICANT CHANGE UP (ref 27–34)
MCHC RBC-ENTMCNC: 32.9 G/DL — SIGNIFICANT CHANGE UP (ref 32–36)
MCV RBC AUTO: 94.7 FL — SIGNIFICANT CHANGE UP (ref 80–100)
NRBC BLD AUTO-RTO: 0 /100 WBCS — SIGNIFICANT CHANGE UP (ref 0–0)
PLATELET # BLD AUTO: 181 K/UL — SIGNIFICANT CHANGE UP (ref 150–400)
POTASSIUM SERPL-MCNC: 4.8 MMOL/L — SIGNIFICANT CHANGE UP (ref 3.5–5.3)
POTASSIUM SERPL-SCNC: 4.8 MMOL/L — SIGNIFICANT CHANGE UP (ref 3.5–5.3)
RBC # BLD: 4.49 M/UL — SIGNIFICANT CHANGE UP (ref 4.2–5.8)
RBC # FLD: 12.7 % — SIGNIFICANT CHANGE UP (ref 10.3–14.5)
SODIUM SERPL-SCNC: 139 MMOL/L — SIGNIFICANT CHANGE UP (ref 135–145)
WBC # BLD: 8.59 K/UL — SIGNIFICANT CHANGE UP (ref 3.8–10.5)
WBC # FLD AUTO: 8.59 K/UL — SIGNIFICANT CHANGE UP (ref 3.8–10.5)

## 2025-05-21 PROCEDURE — 93010 ELECTROCARDIOGRAM REPORT: CPT | Mod: 77

## 2025-05-21 PROCEDURE — 93010 ELECTROCARDIOGRAM REPORT: CPT

## 2025-05-21 PROCEDURE — 93005 ELECTROCARDIOGRAM TRACING: CPT

## 2025-05-21 PROCEDURE — C1725: CPT

## 2025-05-21 PROCEDURE — C1887: CPT

## 2025-05-21 PROCEDURE — 93454 CORONARY ARTERY ANGIO S&I: CPT | Mod: 59

## 2025-05-21 PROCEDURE — C1769: CPT

## 2025-05-21 PROCEDURE — C1894: CPT

## 2025-05-21 PROCEDURE — 80048 BASIC METABOLIC PNL TOTAL CA: CPT

## 2025-05-21 PROCEDURE — C1874: CPT

## 2025-05-21 PROCEDURE — C9600: CPT | Mod: LD

## 2025-05-21 PROCEDURE — 85027 COMPLETE CBC AUTOMATED: CPT

## 2025-05-21 RX ORDER — CLOPIDOGREL BISULFATE 75 MG/1
1 TABLET, FILM COATED ORAL
Qty: 90 | Refills: 0
Start: 2025-05-21 | End: 2025-08-18

## 2025-05-21 RX ORDER — AMLODIPINE BESYLATE 10 MG/1
1 TABLET ORAL
Qty: 90 | Refills: 0
Start: 2025-05-21 | End: 2025-08-18

## 2025-05-21 RX ORDER — METOPROLOL SUCCINATE 50 MG/1
1 TABLET, EXTENDED RELEASE ORAL
Refills: 0 | DISCHARGE

## 2025-05-21 RX ORDER — ACETAMINOPHEN 500 MG/5ML
650 LIQUID (ML) ORAL EVERY 6 HOURS
Refills: 0 | Status: DISCONTINUED | OUTPATIENT
Start: 2025-05-21 | End: 2025-06-04

## 2025-05-21 RX ORDER — ROSUVASTATIN CALCIUM 20 MG/1
1 TABLET, FILM COATED ORAL
Refills: 0 | DISCHARGE

## 2025-05-21 RX ORDER — OLMESARTAN MEDOXOMIL 5 MG/1
1 TABLET, FILM COATED ORAL
Refills: 0 | DISCHARGE

## 2025-05-21 RX ORDER — MELATONIN 5 MG
5 TABLET ORAL AT BEDTIME
Refills: 0 | Status: DISCONTINUED | OUTPATIENT
Start: 2025-05-21 | End: 2025-06-04

## 2025-05-21 RX ORDER — ASPIRIN 325 MG
1 TABLET ORAL
Refills: 0 | DISCHARGE

## 2025-05-21 RX ORDER — METOPROLOL SUCCINATE 50 MG/1
0.5 TABLET, EXTENDED RELEASE ORAL
Refills: 0 | DISCHARGE

## 2025-05-21 RX ORDER — ERGOCALCIFEROL 1.25 MG/1
1 CAPSULE ORAL
Refills: 0 | DISCHARGE

## 2025-05-21 RX ORDER — AMLODIPINE BESYLATE 10 MG/1
5 TABLET ORAL ONCE
Refills: 0 | Status: COMPLETED | OUTPATIENT
Start: 2025-05-21 | End: 2025-05-21

## 2025-05-21 RX ADMIN — Medication 75 MILLILITER(S): at 13:21

## 2025-05-21 RX ADMIN — AMLODIPINE BESYLATE 5 MILLIGRAM(S): 10 TABLET ORAL at 13:01

## 2025-05-21 NOTE — H&P CARDIOLOGY - HISTORY OF PRESENT ILLNESS
83 y/o M with Pmhx of CAD, prior MI s/p PCI, HLD.   He had a recent abnormal stress test and presents today for cardiac cath.      5/7/25 ST:  mild reversible perfusion abnormality of the anteroapical, inferoapical, and inferior walls consistent with ischemia    3/20/25 Echo:  normal LV function EF 60%  tricuspid aortic valve  mild aortic regurgitation    Cardiology: Dr. Mendoza

## 2025-05-21 NOTE — ASU DISCHARGE PLAN (ADULT/PEDIATRIC) - FINANCIAL ASSISTANCE
[facility] provides services at a reduced cost to those who are determined to be eligible through [facility]’s financial assistance program. Information regarding [facility]’s financial assistance program can be found by going to [email or web address] or by calling [phone].

## 2025-05-21 NOTE — ASU PREOP CHECKLIST - NEEDLE GAUGE
Re-auth for Kavon sent to pre-service team    Jered Echols, PharmD. [facility] Specialty Pharmacy Coordinator  [id number] [facility]  T: [phone] F: [phone]  [email or web address]

## 2025-05-21 NOTE — ASU DISCHARGE PLAN (ADULT/PEDIATRIC) - CARE PROVIDER_API CALL
SHALINI ELKINS  [address], [address]  [place] [address]  Phone: [phone]  Fax: ([phone]  Follow Up Time: 2 weeks

## 2025-05-21 NOTE — CHART NOTE - NSCHARTNOTEFT_GEN_A_CORE
Cardiac Rehab (STEMI/NSTEMI/ACS/Unstable Angina/CHF/Chronic Stable Angina/Heart Surgery (CABG,Valve)/Post PCI):              *Education on benefits of Cardiac Rehab provided to patient: Yes         *Referral and Prescription Given for Cardiac Rehab : Yes         *Pt given list of locations & instructed to contact their insurance company to review list of participating providers         *Pt instructed to bring Cardiac Rehab prescription with them to Cardiology Follow up appointment for assistance with enrollment: Yes         *Pt discharged with copies detail cardiovascular history, medications, testing/treatments         *Reasons for NO Cardiac rehab referral rx: [facility]  Urology  [address] [place] [address]  Phone: [phone]  Fax: [phone]  Follow Up Time: 1 week   Layton Khan  Urology  [address] [place] [address]  Phone: [phone]  Fax: [phone]  Scheduled Appointment: 01/02/2025 04:30 PM

## 2025-08-13 RX ORDER — CLOPIDOGREL BISULFATE 75 MG/1
1 TABLET, FILM COATED ORAL
Qty: 90 | Refills: 3
Start: 2025-08-13 | End: 2026-08-07

## 2025-08-13 RX ORDER — AMLODIPINE BESYLATE 10 MG/1
1 TABLET ORAL
Qty: 90 | Refills: 3
Start: 2025-08-13 | End: 2026-08-07

## 2025-08-20 ENCOUNTER — OFFICE (OUTPATIENT)
Dept: URBAN - METROPOLITAN AREA CLINIC 8 | Facility: CLINIC | Age: 85
Setting detail: OPHTHALMOLOGY
End: 2025-08-20
Payer: MEDICARE

## 2025-08-20 DIAGNOSIS — G43.109: ICD-10-CM

## 2025-08-20 DIAGNOSIS — H11.041: ICD-10-CM

## 2025-08-20 DIAGNOSIS — H18.513: ICD-10-CM

## 2025-08-20 DIAGNOSIS — H16.223: ICD-10-CM

## 2025-08-20 DIAGNOSIS — D31.31: ICD-10-CM

## 2025-08-20 DIAGNOSIS — H26.493: ICD-10-CM

## 2025-08-20 DIAGNOSIS — Z96.1: ICD-10-CM

## 2025-08-20 DIAGNOSIS — H52.4: ICD-10-CM

## 2025-08-20 PROCEDURE — 92014 COMPRE OPH EXAM EST PT 1/>: CPT | Performed by: OPHTHALMOLOGY

## 2025-08-20 PROCEDURE — 92015 DETERMINE REFRACTIVE STATE: CPT | Performed by: OPHTHALMOLOGY

## 2025-08-20 ASSESSMENT — REFRACTION_CURRENTRX
OS_AXIS: 158
OS_OVR_VA: 20/
OD_OVR_VA: 20/
OD_CYLINDER: -1.00
OD_ADD: +2.50
OS_ADD: +2.50
OD_AXIS: 114
OS_CYLINDER: -0.50
OD_VPRISM_DIRECTION: PROGS
OS_SPHERE: +1.00
OD_SPHERE: +1.00
OS_VPRISM_DIRECTION: PROGS

## 2025-08-20 ASSESSMENT — REFRACTION_MANIFEST
OS_VA2: 20/25(J1)
OD_ADD: +2.50
OS_VA1: 20/40
OD_VA2: 20/25(J1)
OD_VA2: 20/25(J1)
OU_VA: 20/20-2
OS_AXIS: 125
OS_VA2: 20/25(J1)
OS_CYLINDER: -0.75
OD_VA1: 20/20-2
OD_AXIS: 090
OS_AXIS: 125
OD_SPHERE: +1.25
OS_ADD: +2.50
OU_VA: 20/20-2
OS_SPHERE: +0.75
OD_SPHERE: +1.25
OD_VA1: 20/20-2
OD_CYLINDER: -1.50
OD_AXIS: 090
OD_CYLINDER: -1.50
OD_ADD: +2.75
OS_VA1: 20/40
OS_ADD: +2.75
OS_CYLINDER: -0.75
OS_SPHERE: +0.75

## 2025-08-20 ASSESSMENT — KERATOMETRY
OD_K2POWER_DIOPTERS: 45.25
OS_K1POWER_DIOPTERS: 44.00
OD_AXISANGLE_DEGREES: 042
OD_K1POWER_DIOPTERS: 44.75
OS_AXISANGLE_DEGREES: 010
OS_K2POWER_DIOPTERS: 45.50
METHOD_AUTO_MANUAL: AUTO

## 2025-08-20 ASSESSMENT — REFRACTION_AUTOREFRACTION
OD_SPHERE: +1.25
OD_CYLINDER: -1.25
OS_AXIS: 125
OD_AXIS: 091
OS_SPHERE: +0.50
OS_CYLINDER: -0.50

## 2025-08-20 ASSESSMENT — CONFRONTATIONAL VISUAL FIELD TEST (CVF)
OD_FINDINGS: FULL
OS_FINDINGS: FULL

## 2025-08-20 ASSESSMENT — CORNEAL PTERYGIUM: OD_PTERYGIUM: NASAL 1MM

## 2025-08-20 ASSESSMENT — VISUAL ACUITY
OD_BCVA: 20/40-2
OS_BCVA: 20/30-